# Patient Record
Sex: MALE | Race: WHITE | NOT HISPANIC OR LATINO | URBAN - METROPOLITAN AREA
[De-identification: names, ages, dates, MRNs, and addresses within clinical notes are randomized per-mention and may not be internally consistent; named-entity substitution may affect disease eponyms.]

---

## 2017-02-13 ENCOUNTER — HOSPITAL ENCOUNTER (OUTPATIENT)
Dept: FAMILY MEDICINE CLINIC | Facility: CLINIC | Age: 32
Setting detail: SPECIMEN
Discharge: HOME | End: 2017-02-13
Attending: FAMILY MEDICINE | Admitting: FAMILY MEDICINE

## 2017-02-13 LAB
CHOLEST SERPL-MCNC: 177 MG/DL
CHOLEST/HDLC SERPL: 3.8 {RATIO}
CONV LDL CHOLESTEROL DIRECT: 112 MG/DL (ref 0–100)
GLUCOSE SERPL-MCNC: 83 MG/DL (ref 65–99)
HDLC SERPL-MCNC: 46 MG/DL
LDLC/HDLC SERPL: 2.4 {RATIO}
LIPID INTERPRETATION: ABNORMAL
TRIGL SERPL-MCNC: 78 MG/DL
VLDLC SERPL CALC-MCNC: 18.2 MG/DL

## 2017-08-09 ENCOUNTER — HOSPITAL ENCOUNTER (OUTPATIENT)
Dept: GENERAL RADIOLOGY | Facility: HOSPITAL | Age: 32
Discharge: HOME | End: 2017-08-09
Attending: FAMILY MEDICINE | Admitting: FAMILY MEDICINE

## 2019-04-26 ENCOUNTER — CONVERSION ENCOUNTER (OUTPATIENT)
Dept: FAMILY MEDICINE CLINIC | Facility: CLINIC | Age: 34
End: 2019-04-26

## 2019-05-28 ENCOUNTER — CONVERSION ENCOUNTER (OUTPATIENT)
Dept: FAMILY MEDICINE CLINIC | Facility: CLINIC | Age: 34
End: 2019-05-28

## 2019-06-03 VITALS
RESPIRATION RATE: 20 BRPM | OXYGEN SATURATION: 99 % | HEIGHT: 72 IN | BODY MASS INDEX: 25.52 KG/M2 | DIASTOLIC BLOOD PRESSURE: 71 MMHG | WEIGHT: 188.4 LBS | SYSTOLIC BLOOD PRESSURE: 109 MMHG | HEART RATE: 86 BPM

## 2019-06-04 VITALS
SYSTOLIC BLOOD PRESSURE: 110 MMHG | RESPIRATION RATE: 12 BRPM | OXYGEN SATURATION: 98 % | BODY MASS INDEX: 25.7 KG/M2 | WEIGHT: 189.7 LBS | DIASTOLIC BLOOD PRESSURE: 74 MMHG | HEART RATE: 85 BPM | HEIGHT: 72 IN

## 2019-06-06 NOTE — PROGRESS NOTES
Visit Type:  Acute Visit  Referring Provider:  Ishan Lopez MD  Primary Provider:  Jessica MOTT MD      History of Present Illness:  Patient is here by:         self.   Context/Complaints:      Patient has developed Generalized rash for one week. itches as reported. roxy grass doesnt have allergy to poison ivy in past.   Severity/Character:        while all times   Onset/Timing:               which   Duration:                       since one week   Associated symptoms:  Patient says itches, If he takes an Benedry or Zyrtec it goes away. Says has not chaged routine, Soaps, detergents.  Says no one in the house has the rash.         Past Medical History:     Reviewed history from 09/07/2012 and no changes required:        Bilateral inguinal lymphadenopathy 8/2012    Past Surgical History:     Reviewed history from 09/07/2012 and no changes required:        Right orchiopexy        Right inguinal lymph node biopsy 8/23/2012    Family History Summary:      Reviewed history Last on 04/26/2019 and no changes required:05/28/2019  Father - Has Family History of Hypertension - Entered On: 5/28/2019    General Comments - FH:    Mother: healthy  Siblings: 1 brother and 1 sister healthy      Social History:     Reviewed history from 02/13/2017 and no changes required:         for Katherine ,single        One child         Bicyclist      Risk Factors:     Smoked Tobacco Use:  Never smoker  Smokeless Tobacco Use:  Never  Passive smoke exposure:  no  Drug use:  no  HIV high-risk behavior:  no  Caffeine use:  5+ drinks per day  Alcohol use:  no  Exercise:  no  Seatbelt use:  100 %  Sun Exposure:  occasionally    Family History Risk Factors:     Family History of MI in females < 65 years old:  no     Family History of MI in males < 55 years old:  no    Previous Tobacco Use: Signed On - 04/26/2019  Smoked Tobacco Use:  Never smoker  Smokeless Tobacco Use:  Never  Passive smoke exposure:  no  Drug use:  no  HIV high-risk  behavior:  no  Caffeine use:  5+ drinks per day    Previous Alcohol Use: Signed On - 2019  Alcohol use:  no  Exercise:  no  Seatbelt use:  100 %  Sun Exposure:  occasionally    Family History Risk Factors:     Family History of MI in females < 65 years old:  no     Family History of MI in males < 55 years old:  no      Review of Systems     Derm       Complains of rash.    Psych       Denies anxiety.      Vital Signs:    Patient Profile:    33 Years Old Male  Height:     72 inches  Weight:     189.7 pounds  BMI:        25.73     O2 Sat:     98 %  Temp:       98.2 degrees F oral  Pulse rate: 85 / minute  Resp:       12 per minute  BP Sittin / 74  (left arm)    Cuff size:  large  Patient has a risk of falls? No  Patient in pain?    No    Problems: Active problems were reviewed with the patient during this visit.  Medications: Medications were reviewed with the patient during this visit.  Allergies: Allergies were reviewed with the patient during this visit.  No Known Allergy.  No Known Drug Allergy.        Vitals Entered By: Verena Oropeza (May 28, 2019 11:07 AM)      Vital Signs:    Patient Profile:    33 Years Old Male  Height:     72 inches  Weight:     189.7 pounds  (Measured Weight:   lbs.  oz.)  BMI:        25.73  Temp:       98.2 degrees F oral  Pulse rate: 85 / minute  Resp:       12 per minute  O2 Sat:     98 %    BP sittin / 74  Cuff size:    large   Medications:  Medications were reviewed with the patient during this visit.    Allergies:   No Known Allergies  Allergies were reviewed with the patient during this visit.      Physical Exam    General:      well developed, well nourished, in no acute distress.    Head:      normocephalic and atraumatic.    Eyes:      PERRL/EOM intact, conjunctiva and sclera clear with out nystagmus.    Ears:      TM's intact and clear with normal canals with grossly normal hearing.    Nose:      no deformity, discharge, inflammation, or lesions.    Mouth:      no  deformity or lesions with good dentition.    Neck:      no masses, thyromegaly, or abnormal cervical nodes.    Lungs:      clear bilaterally to auscultation.    Heart:      non-displaced PMI, chest non-tender; regular rate and rhythm, S1, S2 without murmurs, rubs, or gallops  Abdomen:       normal bowel sounds; no hepatosplenomegaly no ventral,umbilical hernias or masses noted.    Msk:      no deformity or scoliosis noted of thoracic or lumbar spine.    Pulses:      pulses normal in all 4 extremities.    Extremities:      no pedal edema.    Neurologic:      no focal deficit  Skin:      has red rash on back of ankles palpable borders. areas of macular papular rash up and down both arms and legs. areas of linear excoriation.   Cervical Nodes:      no significant adenopathy.    Psych:      alert and cooperative; normal mood and affect; normal attention span and concentration.      Diabetes Management Exam:      Foot Exam (with socks and/or shoes not present):        Pulses:           pulses normal in all 4 extremities.        Blood Pressure:  Today's BP: 110/74 mm Hg    Labwork:   Most Recent Lab Results:   LDL: 112 mg/dL 02/13/2017          Impression & Recommendations:    Problem # 1:  Contact dermatitis (ICD-692.9) (LJF56-T03.9)  discussed option of dermatology referral he is to take pictures of his rash and start steroids. use the zyrtec and benadryl for itching. f/u if worsens.   His updated medication list for this problem includes:     Prednisone 10 Mg Oral Tablet (Prednisone) ..... Take 4 by mouth a day for 3 days then 3 a day for 3 days the2 a day for 3 days then 1 a day for 3 days     Cordran 0.05 % External Ointment (Flurandrenolide) ..... 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 he will follow with dermatologist.     Medications Added to Medication List This Visit:  1)   Prednisone 10 Mg Oral Tablet (Prednisone) .... Take 4 by mouth a day for 3 days then 3 a day for 3 days the2 a day for 3 days then 1 a day for 3 days      Patient Instructions:  1)  he is to take the steroids. continue benadryl and zrytec. f/u with dermatologsit          Prescriptions:  PREDNISONE 10 MG ORAL TABLET (PREDNISONE) Take 4 by mouth a day for 3 days then 3 a day for 3 days the2 a day for 3 days then 1 a day for 3 days  #30[Tablet] x 0      Entered and Authorized by:  Miley Dent NP      Electronically signed by:   Miley Dent NP on 05/28/2019      Method used:    Electronically to               [facility]/pharmacy #[id number]* ([email or web address]) [address]  [address]              Ph: [phone]              Fax: [phone]      Note to Pharmacy: Route: ORAL;       RxID:   [id number]                Medication Administration    Orders Added:  1)  Derm Consult [[facility]]  2)  Ofc Vst, Est Level III [32830]        Electronically signed by Miley Dent NP on 05/28/2019 at 11:41 AM  ________________________________________________________________________       Disclaimer: Converted Note message may not contain all data elements that existed in the legacy source system. Please see [employer] [facility] for the original note details.

## 2022-08-31 ENCOUNTER — TELEPHONE (OUTPATIENT)
Dept: FAMILY MEDICINE CLINIC | Facility: CLINIC | Age: 37
End: 2022-08-31

## 2022-08-31 NOTE — TELEPHONE ENCOUNTER
Caller: Toro Calixto    Relationship: Self    Best call back number:[phone]    Do you require a callback: YES-OLD PATIENT OF DR. VALENCIA (NOT SEEN IN OVER 3 YEARS) WOULD LIKE TO COME IN BEFORE DR. VALENCIA RETIRES. PLEASE ADVISE.